# Patient Record
Sex: MALE | Race: OTHER | Employment: FULL TIME | URBAN - METROPOLITAN AREA
[De-identification: names, ages, dates, MRNs, and addresses within clinical notes are randomized per-mention and may not be internally consistent; named-entity substitution may affect disease eponyms.]

---

## 2019-03-24 ENCOUNTER — APPOINTMENT (OUTPATIENT)
Dept: CT IMAGING | Age: 59
DRG: 390 | End: 2019-03-24
Attending: EMERGENCY MEDICINE
Payer: COMMERCIAL

## 2019-03-24 ENCOUNTER — HOSPITAL ENCOUNTER (INPATIENT)
Age: 59
LOS: 2 days | Discharge: HOME | DRG: 390 | End: 2019-03-26
Attending: EMERGENCY MEDICINE | Admitting: SURGERY
Payer: COMMERCIAL

## 2019-03-24 DIAGNOSIS — K56.600 PARTIAL SMALL BOWEL OBSTRUCTION (HCC): ICD-10-CM

## 2019-03-24 DIAGNOSIS — R10.84 ABDOMINAL PAIN, GENERALIZED: Primary | ICD-10-CM

## 2019-03-24 PROBLEM — K56.609 SMALL BOWEL OBSTRUCTION (HCC): Status: ACTIVE | Noted: 2019-03-24

## 2019-03-24 LAB
ALBUMIN SERPL-MCNC: 2.9 G/DL (ref 3.5–5)
ALBUMIN/GLOB SERPL: 0.8 {RATIO} (ref 1.1–2.2)
ALP SERPL-CCNC: 41 U/L (ref 45–117)
ALT SERPL-CCNC: 23 U/L (ref 12–78)
ANION GAP SERPL CALC-SCNC: 8 MMOL/L (ref 5–15)
APPEARANCE UR: CLEAR
AST SERPL-CCNC: 13 U/L (ref 15–37)
BACTERIA URNS QL MICRO: ABNORMAL /HPF
BASOPHILS # BLD: 0.1 K/UL (ref 0–0.1)
BASOPHILS NFR BLD: 1 % (ref 0–1)
BILIRUB DIRECT SERPL-MCNC: 0.1 MG/DL (ref 0–0.2)
BILIRUB SERPL-MCNC: 0.5 MG/DL (ref 0.2–1)
BILIRUB UR QL: NEGATIVE
BUN SERPL-MCNC: 15 MG/DL (ref 6–20)
BUN/CREAT SERPL: 16 (ref 12–20)
CALCIUM SERPL-MCNC: 8 MG/DL (ref 8.5–10.1)
CHLORIDE SERPL-SCNC: 101 MMOL/L (ref 97–108)
CO2 SERPL-SCNC: 28 MMOL/L (ref 21–32)
COLOR UR: ABNORMAL
COMMENT, HOLDF: NORMAL
CREAT SERPL-MCNC: 0.95 MG/DL (ref 0.7–1.3)
DIFFERENTIAL METHOD BLD: ABNORMAL
EOSINOPHIL # BLD: 0.1 K/UL (ref 0–0.4)
EOSINOPHIL NFR BLD: 1 % (ref 0–7)
EPITH CASTS URNS QL MICRO: ABNORMAL /LPF
ERYTHROCYTE [DISTWIDTH] IN BLOOD BY AUTOMATED COUNT: 13.1 % (ref 11.5–14.5)
GLOBULIN SER CALC-MCNC: 3.6 G/DL (ref 2–4)
GLUCOSE SERPL-MCNC: 133 MG/DL (ref 65–100)
GLUCOSE UR STRIP.AUTO-MCNC: NEGATIVE MG/DL
HCT VFR BLD AUTO: 42.7 % (ref 36.6–50.3)
HGB BLD-MCNC: 14.2 G/DL (ref 12.1–17)
HGB UR QL STRIP: NEGATIVE
IMM GRANULOCYTES # BLD AUTO: 0.1 K/UL
IMM GRANULOCYTES NFR BLD AUTO: 1 %
KETONES UR QL STRIP.AUTO: 15 MG/DL
LACTATE SERPL-SCNC: 0.7 MMOL/L (ref 0.4–2)
LEUKOCYTE ESTERASE UR QL STRIP.AUTO: NEGATIVE
LIPASE SERPL-CCNC: 100 U/L (ref 73–393)
LYMPHOCYTES # BLD: 1.5 K/UL (ref 0.8–3.5)
LYMPHOCYTES NFR BLD: 20 % (ref 12–49)
MCH RBC QN AUTO: 26.9 PG (ref 26–34)
MCHC RBC AUTO-ENTMCNC: 33.3 G/DL (ref 30–36.5)
MCV RBC AUTO: 80.9 FL (ref 80–99)
MONOCYTES # BLD: 2 K/UL (ref 0–1)
MONOCYTES NFR BLD: 27 % (ref 5–13)
MUCOUS THREADS URNS QL MICRO: ABNORMAL /LPF
NEUTS BAND NFR BLD MANUAL: 5 % (ref 0–6)
NEUTS SEG # BLD: 3.5 K/UL (ref 1.8–8)
NEUTS SEG NFR BLD: 45 % (ref 32–75)
NITRITE UR QL STRIP.AUTO: NEGATIVE
NRBC # BLD: 0 K/UL (ref 0–0.01)
NRBC BLD-RTO: 0 PER 100 WBC
PH UR STRIP: 6 [PH] (ref 5–8)
PLATELET # BLD AUTO: 303 K/UL (ref 150–400)
PMV BLD AUTO: 9.4 FL (ref 8.9–12.9)
POTASSIUM SERPL-SCNC: 3.5 MMOL/L (ref 3.5–5.1)
PROT SERPL-MCNC: 6.5 G/DL (ref 6.4–8.2)
PROT UR STRIP-MCNC: ABNORMAL MG/DL
RBC # BLD AUTO: 5.28 M/UL (ref 4.1–5.7)
RBC #/AREA URNS HPF: ABNORMAL /HPF (ref 0–5)
RBC MORPH BLD: ABNORMAL
SAMPLES BEING HELD,HOLD: NORMAL
SODIUM SERPL-SCNC: 137 MMOL/L (ref 136–145)
SP GR UR REFRACTOMETRY: 1.01 (ref 1–1.03)
UR CULT HOLD, URHOLD: NORMAL
UROBILINOGEN UR QL STRIP.AUTO: 0.2 EU/DL (ref 0.2–1)
WBC # BLD AUTO: 7.3 K/UL (ref 4.1–11.1)
WBC URNS QL MICRO: ABNORMAL /HPF (ref 0–4)

## 2019-03-24 PROCEDURE — 81001 URINALYSIS AUTO W/SCOPE: CPT

## 2019-03-24 PROCEDURE — 65270000032 HC RM SEMIPRIVATE

## 2019-03-24 PROCEDURE — 80076 HEPATIC FUNCTION PANEL: CPT

## 2019-03-24 PROCEDURE — 99284 EMERGENCY DEPT VISIT MOD MDM: CPT

## 2019-03-24 PROCEDURE — 85025 COMPLETE CBC W/AUTO DIFF WBC: CPT

## 2019-03-24 PROCEDURE — 83605 ASSAY OF LACTIC ACID: CPT

## 2019-03-24 PROCEDURE — 96360 HYDRATION IV INFUSION INIT: CPT

## 2019-03-24 PROCEDURE — 74011636320 HC RX REV CODE- 636/320: Performed by: EMERGENCY MEDICINE

## 2019-03-24 PROCEDURE — 80048 BASIC METABOLIC PNL TOTAL CA: CPT

## 2019-03-24 PROCEDURE — 83690 ASSAY OF LIPASE: CPT

## 2019-03-24 PROCEDURE — 74011250636 HC RX REV CODE- 250/636: Performed by: SURGERY

## 2019-03-24 PROCEDURE — 36415 COLL VENOUS BLD VENIPUNCTURE: CPT

## 2019-03-24 PROCEDURE — 74177 CT ABD & PELVIS W/CONTRAST: CPT

## 2019-03-24 PROCEDURE — 74011250636 HC RX REV CODE- 250/636: Performed by: EMERGENCY MEDICINE

## 2019-03-24 PROCEDURE — 74011250637 HC RX REV CODE- 250/637: Performed by: SURGERY

## 2019-03-24 RX ORDER — SODIUM CHLORIDE, SODIUM LACTATE, POTASSIUM CHLORIDE, CALCIUM CHLORIDE 600; 310; 30; 20 MG/100ML; MG/100ML; MG/100ML; MG/100ML
75 INJECTION, SOLUTION INTRAVENOUS CONTINUOUS
Status: DISCONTINUED | OUTPATIENT
Start: 2019-03-24 | End: 2019-03-26 | Stop reason: HOSPADM

## 2019-03-24 RX ORDER — ALPRAZOLAM 1 MG/1
1 TABLET ORAL
COMMUNITY

## 2019-03-24 RX ORDER — ONDANSETRON 2 MG/ML
4 INJECTION INTRAMUSCULAR; INTRAVENOUS
Status: DISCONTINUED | OUTPATIENT
Start: 2019-03-24 | End: 2019-03-26 | Stop reason: HOSPADM

## 2019-03-24 RX ORDER — HYDROMORPHONE HYDROCHLORIDE 1 MG/ML
1 INJECTION, SOLUTION INTRAMUSCULAR; INTRAVENOUS; SUBCUTANEOUS
Status: DISCONTINUED | OUTPATIENT
Start: 2019-03-24 | End: 2019-03-26 | Stop reason: HOSPADM

## 2019-03-24 RX ORDER — SODIUM CHLORIDE 9 MG/ML
50 INJECTION, SOLUTION INTRAVENOUS
Status: COMPLETED | OUTPATIENT
Start: 2019-03-24 | End: 2019-03-24

## 2019-03-24 RX ORDER — CITALOPRAM 10 MG/1
10 TABLET ORAL DAILY
COMMUNITY

## 2019-03-24 RX ORDER — ACETAMINOPHEN 325 MG/1
650 TABLET ORAL
Status: DISCONTINUED | OUTPATIENT
Start: 2019-03-24 | End: 2019-03-26 | Stop reason: HOSPADM

## 2019-03-24 RX ORDER — SIMVASTATIN 40 MG/1
40 TABLET, FILM COATED ORAL
COMMUNITY

## 2019-03-24 RX ORDER — LISINOPRIL 20 MG/1
20 TABLET ORAL DAILY
COMMUNITY

## 2019-03-24 RX ORDER — SODIUM CHLORIDE 0.9 % (FLUSH) 0.9 %
10 SYRINGE (ML) INJECTION
Status: COMPLETED | OUTPATIENT
Start: 2019-03-24 | End: 2019-03-24

## 2019-03-24 RX ADMIN — SODIUM CHLORIDE 1000 ML: 900 INJECTION, SOLUTION INTRAVENOUS at 14:48

## 2019-03-24 RX ADMIN — SODIUM CHLORIDE 1000 ML: 900 INJECTION, SOLUTION INTRAVENOUS at 14:25

## 2019-03-24 RX ADMIN — IOPAMIDOL 100 ML: 755 INJECTION, SOLUTION INTRAVENOUS at 14:58

## 2019-03-24 RX ADMIN — ACETAMINOPHEN 650 MG: 325 TABLET ORAL at 16:41

## 2019-03-24 RX ADMIN — SODIUM CHLORIDE 50 ML/HR: 900 INJECTION, SOLUTION INTRAVENOUS at 14:58

## 2019-03-24 RX ADMIN — Medication 10 ML: at 14:58

## 2019-03-24 RX ADMIN — SODIUM CHLORIDE, SODIUM LACTATE, POTASSIUM CHLORIDE, AND CALCIUM CHLORIDE 125 ML/HR: 600; 310; 30; 20 INJECTION, SOLUTION INTRAVENOUS at 18:52

## 2019-03-24 NOTE — H&P
Chief Complaint:  Partial small bowel obstruction HPI:  64 yo man with hx HTN and no prior abdominal operation presented to [place] with abdominal pain for 4 days. Pt reported pain developed on Tuesday. Pt reported some diarrhea. Mild nausea and pt noted fever. He is afebrile in ER and no leukocytosis. CT scan showed mildly dilated loops of bowel with questionable mesenteric twisting concerning for possible internal hernia. Pt reported strenuous exercise over past week and thought it may have precipitated his pain. Past Medical History:  
Diagnosis Date  Hypercholesteremia  Hypertension  Psychiatric disorder Depression; Anxiety History reviewed. No pertinent surgical history. No current facility-administered medications on file prior to encounter. Current Outpatient Medications on File Prior to Encounter Medication Sig Dispense Refill  simvastatin (ZOCOR) 40 mg tablet Take 40 mg by mouth nightly.  lisinopril (PRINIVIL, ZESTRIL) 20 mg tablet Take 20 mg by mouth daily.  citalopram (CELEXA) 10 mg tablet Take 10 mg by mouth daily.  ALPRAZolam (XANAX) 1 mg tablet Take 1 mg by mouth. No Known Allergies Review of Systems - General ROS: negative Psychological ROS: negative Respiratory ROS: negative Cardiovascular ROS: negative Gastrointestinal ROS: positive for - abdominal pain and nausea Visit Vitals /79 (BP 1 Location: Right arm, BP Patient Position: At rest) Pulse 97 Temp 99.6 °F (37.6 °C) Resp 16 Wt 233 lb 14.5 oz (106.1 kg) SpO2 94% Physical Exam:   
Gen:  NAD Pulm:  Unlabored Abd:  S/ND/mild diffuse TTP without guarding or rebound Recent Results (from the past 24 hour(s)) CBC WITH AUTOMATED DIFF Collection Time: 03/24/19  2:16 PM  
Result Value Ref Range WBC 7.3 4.1 - 11.1 K/uL  
 RBC 5.28 4.10 - 5.70 M/uL  
 HGB 14.2 12.1 - 17.0 g/dL HCT 42.7 36.6 - 50.3 %  MCV 80.9 80.0 - 99.0 FL  
 MCH 26.9 26.0 - 34.0 PG  
 MCHC 33.3 30.0 - 36.5 g/dL  
 RDW 13.1 11.5 - 14.5 % PLATELET 336 346 - 678 K/uL MPV 9.4 8.9 - 12.9 FL  
 NRBC 0.0 0  WBC ABSOLUTE NRBC 0.00 0.00 - 0.01 K/uL NEUTROPHILS 45 32 - 75 % BAND NEUTROPHILS 5 0 - 6 % LYMPHOCYTES 20 12 - 49 % MONOCYTES 27 (H) 5 - 13 % EOSINOPHILS 1 0 - 7 % BASOPHILS 1 0 - 1 % IMMATURE GRANULOCYTES 1 %  
 ABS. NEUTROPHILS 3.5 1.8 - 8.0 K/UL  
 ABS. LYMPHOCYTES 1.5 0.8 - 3.5 K/UL  
 ABS. MONOCYTES 2.0 (H) 0.0 - 1.0 K/UL  
 ABS. EOSINOPHILS 0.1 0.0 - 0.4 K/UL  
 ABS. BASOPHILS 0.1 0.0 - 0.1 K/UL  
 ABS. IMM. GRANS. 0.1 K/UL  
 DF MANUAL    
 RBC COMMENTS NORMOCYTIC, NORMOCHROMIC    
HEPATIC FUNCTION PANEL Collection Time: 03/24/19  2:16 PM  
Result Value Ref Range Protein, total 6.5 6.4 - 8.2 g/dL Albumin 2.9 (L) 3.5 - 5.0 g/dL Globulin 3.6 2.0 - 4.0 g/dL A-G Ratio 0.8 (L) 1.1 - 2.2 Bilirubin, total 0.5 0.2 - 1.0 MG/DL Bilirubin, direct 0.1 0.0 - 0.2 MG/DL Alk. phosphatase 41 (L) 45 - 117 U/L  
 AST (SGOT) 13 (L) 15 - 37 U/L  
 ALT (SGPT) 23 12 - 78 U/L  
LIPASE Collection Time: 03/24/19  2:16 PM  
Result Value Ref Range Lipase 100 73 - 584 U/L  
METABOLIC PANEL, BASIC Collection Time: 03/24/19  2:16 PM  
Result Value Ref Range Sodium 137 136 - 145 mmol/L Potassium 3.5 3.5 - 5.1 mmol/L Chloride 101 97 - 108 mmol/L  
 CO2 28 21 - 32 mmol/L Anion gap 8 5 - 15 mmol/L Glucose 133 (H) 65 - 100 mg/dL BUN 15 6 - 20 MG/DL Creatinine 0.95 0.70 - 1.30 MG/DL  
 BUN/Creatinine ratio 16 12 - 20 GFR est AA >60 >60 ml/min/1.73m2 GFR est non-AA >60 >60 ml/min/1.73m2 Calcium 8.0 (L) 8.5 - 10.1 MG/DL  
SAMPLES BEING HELD Collection Time: 03/24/19  2:16 PM  
Result Value Ref Range SAMPLES BEING HELD 1BLUE, 1RED COMMENT Add-on orders for these samples will be processed based on acceptable specimen integrity and analyte stability, which may vary by analyte. URINALYSIS W/MICROSCOPIC Collection Time: 03/24/19  3:20 PM  
Result Value Ref Range Color YELLOW/STRAW Appearance CLEAR CLEAR Specific gravity 1.010 1.003 - 1.030    
 pH (UA) 6.0 5.0 - 8.0 Protein TRACE (A) NEG mg/dL Glucose NEGATIVE  NEG mg/dL Ketone 15 (A) NEG mg/dL Bilirubin NEGATIVE  NEG Blood NEGATIVE  NEG Urobilinogen 0.2 0.2 - 1.0 EU/dL Nitrites NEGATIVE  NEG Leukocyte Esterase NEGATIVE  NEG    
 WBC 0-4 0 - 4 /hpf  
 RBC 0-5 0 - 5 /hpf Epithelial cells FEW FEW /lpf Bacteria 1+ (A) NEG /hpf Mucus 1+ (A) NEG /lpf URINE CULTURE HOLD SAMPLE Collection Time: 03/24/19  3:20 PM  
Result Value Ref Range Urine culture hold URINE ON HOLD IN MICROBIOLOGY DEPT FOR 3 DAYS. IF UNPRESERVED URINE IS SUBMITTED, IT CANNOT BE USED FOR ADDITIONAL TESTING AFTER 24 HRS, RECOLLECTION WILL BE REQUIRED. LACTIC ACID Collection Time: 03/24/19  4:20 PM  
Result Value Ref Range Lactic acid 0.7 0.4 - 2.0 MMOL/L  
 
 
AP: 64 yo man with abdominal pain - Abdominal pain:  Unclear etiology. Will admit for IV fluid hydration and close monitoring given concern for internal hernia. Patient does not have peritonitis. - NPO with sips of clears - Labs in am  
- Repeat AXR in am

## 2019-03-24 NOTE — CONSULTS
Chief Complaint:  Partial small bowel obstruction    HPI:  64 yo man with hx HTN and no prior abdominal operation presented to [facility] with abdominal pain for 4 days. Pt reported pain developed on Tuesday. Pt reported some diarrhea. Mild nausea and pt noted fever. He is afebrile in ER and no leukocytosis. CT scan showed mildly dilated loops of bowel with questionable mesenteric twisting concerning for possible internal hernia. Pt reported strenuous exercise over past week and thought it may have precipitated his pain. Past Medical History:   Diagnosis Date    Hypercholesteremia     Hypertension     Psychiatric disorder     Depression; Anxiety       History reviewed. No pertinent surgical history. No current facility-administered medications on file prior to encounter. Current Outpatient Medications on File Prior to Encounter   Medication Sig Dispense Refill    simvastatin (ZOCOR) 40 mg tablet Take 40 mg by mouth nightly.  lisinopril (PRINIVIL, ZESTRIL) 20 mg tablet Take 20 mg by mouth daily.  citalopram (CELEXA) 10 mg tablet Take 10 mg by mouth daily.  ALPRAZolam (XANAX) 1 mg tablet Take 1 mg by mouth.          No Known Allergies    Review of Systems - General ROS: negative  Psychological ROS: negative  Respiratory ROS: negative  Cardiovascular ROS: negative  Gastrointestinal ROS: positive for - abdominal pain and nausea    Visit Vitals  /79 (BP 1 Location: Right arm, BP Patient Position: At rest)   Pulse 97   Temp 99.6 °F (37.6 °C)   Resp 16   Wt 233 lb 14.5 oz (106.1 kg)   SpO2 94%         Physical Exam:    Gen:  NAD  Pulm:  Unlabored  Abd:  S/ND/mild diffuse TTP without guarding or rebound    Recent Results (from the past 24 hour(s))   CBC WITH AUTOMATED DIFF    Collection Time: 03/24/19  2:16 PM   Result Value Ref Range    WBC 7.3 4.1 - 11.1 K/uL    RBC 5.28 4.10 - 5.70 M/uL    HGB 14.2 12.1 - 17.0 g/dL    HCT 42.7 36.6 - 50.3 %    MCV 80.9 80.0 - 99.0 FL    MCH 26.9 26.0 - 34.0 PG    MCHC 33.3 30.0 - 36.5 g/dL    RDW 13.1 11.5 - 14.5 %    PLATELET 952 414 - 240 K/uL    MPV 9.4 8.9 - 12.9 FL    NRBC 0.0 0  WBC    ABSOLUTE NRBC 0.00 0.00 - 0.01 K/uL    NEUTROPHILS 45 32 - 75 %    BAND NEUTROPHILS 5 0 - 6 %    LYMPHOCYTES 20 12 - 49 %    MONOCYTES 27 (H) 5 - 13 %    EOSINOPHILS 1 0 - 7 %    BASOPHILS 1 0 - 1 %    IMMATURE GRANULOCYTES 1 %    ABS. NEUTROPHILS 3.5 1.8 - 8.0 K/UL    ABS. LYMPHOCYTES 1.5 0.8 - 3.5 K/UL    ABS. MONOCYTES 2.0 (H) 0.0 - 1.0 K/UL    ABS. EOSINOPHILS 0.1 0.0 - 0.4 K/UL    ABS. BASOPHILS 0.1 0.0 - 0.1 K/UL    ABS. IMM. GRANS. 0.1 K/UL    DF MANUAL      RBC COMMENTS NORMOCYTIC, NORMOCHROMIC     HEPATIC FUNCTION PANEL    Collection Time: 03/24/19  2:16 PM   Result Value Ref Range    Protein, total 6.5 6.4 - 8.2 g/dL    Albumin 2.9 (L) 3.5 - 5.0 g/dL    Globulin 3.6 2.0 - 4.0 g/dL    A-G Ratio 0.8 (L) 1.1 - 2.2      Bilirubin, total 0.5 0.2 - 1.0 MG/DL    Bilirubin, direct 0.1 0.0 - 0.2 MG/DL    Alk. phosphatase 41 (L) 45 - 117 U/L    AST (SGOT) 13 (L) 15 - 37 U/L    ALT (SGPT) 23 12 - 78 U/L   LIPASE    Collection Time: 03/24/19  2:16 PM   Result Value Ref Range    Lipase 100 73 - 118 U/L   METABOLIC PANEL, BASIC    Collection Time: 03/24/19  2:16 PM   Result Value Ref Range    Sodium 137 136 - 145 mmol/L    Potassium 3.5 3.5 - 5.1 mmol/L    Chloride 101 97 - 108 mmol/L    CO2 28 21 - 32 mmol/L    Anion gap 8 5 - 15 mmol/L    Glucose 133 (H) 65 - 100 mg/dL    BUN 15 6 - 20 MG/DL    Creatinine 0.95 0.70 - 1.30 MG/DL    BUN/Creatinine ratio 16 12 - 20      GFR est AA >60 >60 ml/min/1.73m2    GFR est non-AA >60 >60 ml/min/1.73m2    Calcium 8.0 (L) 8.5 - 10.1 MG/DL   SAMPLES BEING HELD    Collection Time: 03/24/19  2:16 PM   Result Value Ref Range    SAMPLES BEING HELD 1BLUE, 1RED     COMMENT        Add-on orders for these samples will be processed based on acceptable specimen integrity and analyte stability, which may vary by analyte.    URINALYSIS W/MICROSCOPIC    Collection Time: 03/24/19  3:20 PM   Result Value Ref Range    Color YELLOW/STRAW      Appearance CLEAR CLEAR      Specific gravity 1.010 1.003 - 1.030      pH (UA) 6.0 5.0 - 8.0      Protein TRACE (A) NEG mg/dL    Glucose NEGATIVE  NEG mg/dL    Ketone 15 (A) NEG mg/dL    Bilirubin NEGATIVE  NEG      Blood NEGATIVE  NEG      Urobilinogen 0.2 0.2 - 1.0 EU/dL    Nitrites NEGATIVE  NEG      Leukocyte Esterase NEGATIVE  NEG      WBC 0-4 0 - 4 /hpf    RBC 0-5 0 - 5 /hpf    Epithelial cells FEW FEW /lpf    Bacteria 1+ (A) NEG /hpf    Mucus 1+ (A) NEG /lpf   URINE CULTURE HOLD SAMPLE    Collection Time: 03/24/19  3:20 PM   Result Value Ref Range    Urine culture hold        URINE ON HOLD IN MICROBIOLOGY DEPT FOR 3 DAYS. IF UNPRESERVED URINE IS SUBMITTED, IT CANNOT BE USED FOR ADDITIONAL TESTING AFTER 24 HRS, RECOLLECTION WILL BE REQUIRED. LACTIC ACID    Collection Time: 03/24/19  4:20 PM   Result Value Ref Range    Lactic acid 0.7 0.4 - 2.0 MMOL/L       AP: 64 yo man with abdominal pain    - Abdominal pain:  Unclear etiology. Will admit for IV fluid hydration and close monitoring given concern for internal hernia. Patient does not have peritonitis.   - NPO with sips of clears  - Labs in am   - Repeat AXR in am

## 2019-03-24 NOTE — ED PROVIDER NOTES
58-year-old white male presents to the emergency department with abdominal pain and fever. Patient reports that about 4 days ago he started having mid abdominal cramping and bloating. He reports he's had several episodes of diarrhea per day for the past several days. He had a fever to 102°F today. Abdominal pain currently is mild. Pain is central. He feels more of a bloating and a pressure. He says that moving and walking stairs causes his abdominal pain to be worse. Patient denies vomiting. No dysuria. He has been urinating a little less frequently because he thinks he is dehydrated. No cough. No shortness of breath or chest pain. No headaches. Patient otherwise has no other medical problems. No previous abdominal surgeries. Past Medical History:  
Diagnosis Date  Hypertension No past surgical history on file. No family history on file. Social History Socioeconomic History  Marital status: Not on file Spouse name: Not on file  Number of children: Not on file  Years of education: Not on file  Highest education level: Not on file Occupational History  Not on file Social Needs  Financial resource strain: Not on file  Food insecurity:  
  Worry: Not on file Inability: Not on file  Transportation needs:  
  Medical: Not on file Non-medical: Not on file Tobacco Use  Smoking status: Not on file Substance and Sexual Activity  Alcohol use: Not Currently  Drug use: Not on file  Sexual activity: Not on file Lifestyle  Physical activity:  
  Days per week: Not on file Minutes per session: Not on file  Stress: Not on file Relationships  Social connections:  
  Talks on phone: Not on file Gets together: Not on file Attends [employer] service: Not on file Active member of club or organization: Not on file Attends meetings of clubs or organizations: Not on file Relationship status: Not on file  Intimate partner violence:  
  Fear of current or ex partner: Not on file Emotionally abused: Not on file Physically abused: Not on file Forced sexual activity: Not on file Other Topics Concern  Not on file Social History Narrative  Not on file ALLERGIES: Patient has no allergy information on record. Review of Systems Constitutional: Negative for fever. HENT: Negative for congestion. Eyes: Negative for pain. Respiratory: Negative for shortness of breath. Cardiovascular: Negative for chest pain and leg swelling. Gastrointestinal: Positive for abdominal pain and diarrhea. Negative for nausea and vomiting. Endocrine: Negative for polyuria. Genitourinary: Positive for decreased urine volume. Negative for flank pain. Musculoskeletal: Negative for gait problem, neck pain and neck stiffness. Skin: Negative for color change. Allergic/Immunologic: Negative for immunocompromised state. Neurological: Negative for speech difficulty and headaches. Hematological: Does not bruise/bleed easily. Psychiatric/Behavioral: Negative for confusion. All other systems reviewed and are negative. There were no vitals filed for this visit. Physical Exam  
Constitutional: He is oriented to person, place, and time. He appears well-developed and well-nourished. No distress. HENT:  
Head: Normocephalic and atraumatic. Right Ear: External ear normal.  
Left Ear: External ear normal.  
Eyes: Pupils are equal, round, and reactive to light. EOM are normal.  
Neck: Normal range of motion. Neck supple. No JVD present. No tracheal deviation present. Cardiovascular: Normal rate, regular rhythm and normal heart sounds. Exam reveals no gallop and no friction rub. No murmur heard. Pulmonary/Chest: Effort normal and breath sounds normal. No stridor. No respiratory distress. He has no wheezes. He has no rales. Abdominal: Soft. Bowel sounds are normal. He exhibits no distension. There is tenderness. There is no rebound and no guarding. Mild mid abd pain with palpation, no upper abd pain, no lower abd pain, pain is mild periumbilical and with deep palpation only Musculoskeletal: Normal range of motion. He exhibits no edema or tenderness. Neurological: He is alert and oriented to person, place, and time. He has normal reflexes. No cranial nerve deficit or sensory deficit. He exhibits normal muscle tone. Coordination normal.  
Skin: Skin is warm and dry. No rash noted. He is not diaphoretic. No erythema. Psychiatric: He has a normal mood and affect. His behavior is normal. Judgment and thought content normal.  
Nursing note and vitals reviewed. MDM Number of Diagnoses or Management Options Diagnosis management comments: Patient has mild mid abdominal pain. Patient has fever. We'll check urinalysis, blood work, CT scan of the abdomen. Differential diagnosis includes viral gastroenteritis, pancreatitis, colitis, diverticulitis, appendicitis. We'll reassess after testing. Patient agrees w plan. Amount and/or Complexity of Data Reviewed Clinical lab tests: reviewed and ordered Tests in the radiology section of CPT®: ordered and reviewed Tests in the medicine section of CPT®: ordered and reviewed Decide to obtain previous medical records or to obtain history from someone other than the patient: yes Obtain history from someone other than the patient: yes Review and summarize past medical records: yes Independent visualization of images, tracings, or specimens: yes Procedures Blood work is WNL 
 
CT abd: Mid abd shows possible internal hernia and possible small bowel obstruction UA: Clear CONSULT 
4:06 PM 
I spoke to Dr Augustine Almanzar who will admit pt for serial exams and to make sure his symptoms improve. PT is fine w/ this plan

## 2019-03-24 NOTE — ED TRIAGE NOTES
TRIAGE NOTES: Pt c/o stomach cramps, low grade fever since last Tuesday. Diarrhea  but \"stopped Thursday\". Nausea. Pt denies SOB, HA, Vomiting. Hasn't been eating. Ibuprofen taken last night for fever.

## 2019-03-24 NOTE — ED NOTES
TRANSFER - OUT REPORT: 
 
Verbal report given to Batsheva Sender (name) on Tamika Webber  being transferred to [facility] Rm 515(unit) for routine progression of care Report consisted of patients Situation, Background, Assessment and  
Recommendations(SBAR). Information from the following report(s) SBAR, Kardex, ED Summary, [facility] and Recent Results was reviewed with the receiving nurse. Lines:  
Peripheral IV 03/24/19 Left Forearm (Active) Opportunity for questions and clarification was provided. Patient transported with: 
 Holden august

## 2019-03-25 ENCOUNTER — APPOINTMENT (OUTPATIENT)
Dept: GENERAL RADIOLOGY | Age: 59
DRG: 390 | End: 2019-03-25
Attending: SURGERY
Payer: COMMERCIAL

## 2019-03-25 LAB
ANION GAP SERPL CALC-SCNC: 7 MMOL/L (ref 5–15)
BUN SERPL-MCNC: 11 MG/DL (ref 6–20)
BUN/CREAT SERPL: 15 (ref 12–20)
CALCIUM SERPL-MCNC: 7.9 MG/DL (ref 8.5–10.1)
CHLORIDE SERPL-SCNC: 105 MMOL/L (ref 97–108)
CO2 SERPL-SCNC: 25 MMOL/L (ref 21–32)
CREAT SERPL-MCNC: 0.74 MG/DL (ref 0.7–1.3)
ERYTHROCYTE [DISTWIDTH] IN BLOOD BY AUTOMATED COUNT: 12.8 % (ref 11.5–14.5)
GLUCOSE BLD STRIP.AUTO-MCNC: 92 MG/DL (ref 65–100)
GLUCOSE SERPL-MCNC: 88 MG/DL (ref 65–100)
HCT VFR BLD AUTO: 39.5 % (ref 36.6–50.3)
HGB BLD-MCNC: 13 G/DL (ref 12.1–17)
MCH RBC QN AUTO: 27.1 PG (ref 26–34)
MCHC RBC AUTO-ENTMCNC: 32.9 G/DL (ref 30–36.5)
MCV RBC AUTO: 82.3 FL (ref 80–99)
NRBC # BLD: 0 K/UL (ref 0–0.01)
NRBC BLD-RTO: 0 PER 100 WBC
PLATELET # BLD AUTO: 296 K/UL (ref 150–400)
PMV BLD AUTO: 9.9 FL (ref 8.9–12.9)
POTASSIUM SERPL-SCNC: 3.4 MMOL/L (ref 3.5–5.1)
RBC # BLD AUTO: 4.8 M/UL (ref 4.1–5.7)
SERVICE CMNT-IMP: NORMAL
SODIUM SERPL-SCNC: 137 MMOL/L (ref 136–145)
WBC # BLD AUTO: 7.2 K/UL (ref 4.1–11.1)

## 2019-03-25 PROCEDURE — 80048 BASIC METABOLIC PNL TOTAL CA: CPT

## 2019-03-25 PROCEDURE — 74011250636 HC RX REV CODE- 250/636: Performed by: SURGERY

## 2019-03-25 PROCEDURE — 85027 COMPLETE CBC AUTOMATED: CPT

## 2019-03-25 PROCEDURE — 82962 GLUCOSE BLOOD TEST: CPT

## 2019-03-25 PROCEDURE — 74019 RADEX ABDOMEN 2 VIEWS: CPT

## 2019-03-25 PROCEDURE — 36415 COLL VENOUS BLD VENIPUNCTURE: CPT

## 2019-03-25 PROCEDURE — 65270000032 HC RM SEMIPRIVATE

## 2019-03-25 PROCEDURE — 74011250637 HC RX REV CODE- 250/637: Performed by: SURGERY

## 2019-03-25 PROCEDURE — 71045 X-RAY EXAM CHEST 1 VIEW: CPT

## 2019-03-25 RX ORDER — OXYCODONE HYDROCHLORIDE 5 MG/1
5 TABLET ORAL
Status: DISCONTINUED | OUTPATIENT
Start: 2019-03-25 | End: 2019-03-26 | Stop reason: HOSPADM

## 2019-03-25 RX ADMIN — ACETAMINOPHEN 650 MG: 325 TABLET ORAL at 03:09

## 2019-03-25 RX ADMIN — SODIUM CHLORIDE, SODIUM LACTATE, POTASSIUM CHLORIDE, AND CALCIUM CHLORIDE 75 ML/HR: 600; 310; 30; 20 INJECTION, SOLUTION INTRAVENOUS at 21:28

## 2019-03-25 RX ADMIN — SODIUM CHLORIDE, SODIUM LACTATE, POTASSIUM CHLORIDE, AND CALCIUM CHLORIDE 125 ML/HR: 600; 310; 30; 20 INJECTION, SOLUTION INTRAVENOUS at 03:07

## 2019-03-25 NOTE — PROGRESS NOTES
Mr. Danielle Soto feels better this PM. No nausea or vomitting. Moving bowels. Tm 102 Tc 98.1 HR: 89 BP: 139/79 Resp Rate: 14 95% sat on 0.5 liters/minute. Intake/Output Summary (Last 24 hours) at 3/25/2019 1257 Last data filed at 3/25/2019 4296 Gross per 24 hour Intake  Output 1030 ml Net -1030 ml Exam: Cor: RRR. Lungs: Bilateral breath sounds. Clear to auscultation. Abd: Soft. Non distended. Non tender. No guarding or rebound. Small, reducible umbilical hernia. Labs:  
Recent Results (from the past 12 hour(s)) CBC W/O DIFF Collection Time: 03/25/19  3:21 AM  
Result Value Ref Range WBC 7.2 4.1 - 11.1 K/uL  
 RBC 4.80 4. 10 - 5.70 M/uL  
 HGB 13.0 12.1 - 17.0 g/dL HCT 39.5 36.6 - 50.3 % MCV 82.3 80.0 - 99.0 FL  
 MCH 27.1 26.0 - 34.0 PG  
 MCHC 32.9 30.0 - 36.5 g/dL  
 RDW 12.8 11.5 - 14.5 % PLATELET 096 035 - 225 K/uL MPV 9.9 8.9 - 12.9 FL  
 NRBC 0.0 0  WBC ABSOLUTE NRBC 0.00 0.00 - 0.01 K/uL METABOLIC PANEL, BASIC Collection Time: 03/25/19  3:21 AM  
Result Value Ref Range Sodium 137 136 - 145 mmol/L Potassium 3.4 (L) 3.5 - 5.1 mmol/L Chloride 105 97 - 108 mmol/L  
 CO2 25 21 - 32 mmol/L Anion gap 7 5 - 15 mmol/L Glucose 88 65 - 100 mg/dL BUN 11 6 - 20 MG/DL Creatinine 0.74 0.70 - 1.30 MG/DL  
 BUN/Creatinine ratio 15 12 - 20 GFR est AA >60 >60 ml/min/1.73m2 GFR est non-AA >60 >60 ml/min/1.73m2 Calcium 7.9 (L) 8.5 - 10.1 MG/DL  
GLUCOSE, POC Collection Time: 03/25/19  3:26 AM  
Result Value Ref Range Glucose (POC) 92 65 - 100 mg/dL Performed by Bee Cevallos Will try clear liquid diet and advance as tolerated. Decrease IVF to 75 ml/hour. OOB, Ambulate. Anti-emetics and pain medication as needed.

## 2019-03-25 NOTE — PROGRESS NOTES
Bedside shift change report given to Kristine Viveros (oncoming nurse) by Shira Nuñez (offgoing nurse). Report included the following information SBAR, Kardex, MAR and Quality Measures.

## 2019-03-25 NOTE — PROGRESS NOTES
Bedside shift change report given to Shantanu Alejandro RN (oncoming nurse) by Yasmeen Witt RN (offgoing nurse). Report included the following information SBAR, ED summary, kardex.

## 2019-03-25 NOTE — PROGRESS NOTES
General Surgery Daily Progress Note Admit Date: 3/24/2019 Post-Operative Day: * No surgery found * from * No surgery found * Subjective:  
 
Last 24 hrs: Pt doesn't c/o any abd pain at present; no n/v. Feels somewhat bloated; passing a little gas, no bM  
KUB done and pending. Temp 100.6 at 2am, nl WBC Objective:  
 
Blood pressure 139/79, pulse 89, temperature 98.1 °F (36.7 °C), resp. rate 14, weight 233 lb 14.5 oz (106.1 kg), SpO2 95 %. Temp (24hrs), Av.7 °F (37.6 °C), Min:98.1 °F (36.7 °C), Max:102 °F (38.9 °C) 
 
 
_____________________ Physical Exam:    
Alert and Oriented, x3, in no acute distress. Cardiovascular: RRR, no peripheral edema Abdomen: soft w/ some distention, diminished BS, NT Assessment:  
Active Problems: 
  Small bowel obstruction ([address] (3/24/2019) Plan:  
 
Cont NPO Ck KUB Cont IVF Monitor temps Data Review: 
 
Recent Labs  
  19 
0321 19 
1416 WBC 7.2 7.3 HGB 13.0 14.2 HCT 39.5 42.7  303 Recent Labs  
  19 
0321 19 
1416  137  
K 3.4* 3.5  101 CO2 25 28 GLU 88 133* BUN 11 15 CREA 0.74 0.95  
CA 7.9* 8.0* ALB  --  2.9*  
SGOT  --  13* ALT  --  23 Recent Labs  
  19 
1416 LPSE 100  
 
 
 
 
______________________ Medications: 
 
Current Facility-Administered Medications Medication Dose Route Frequency  lactated Ringers infusion  125 mL/hr IntraVENous CONTINUOUS  
 ondansetron (ZOFRAN) injection 4 mg  4 mg IntraVENous Q4H PRN  
 HYDROmorphone (PF) (DILAUDID) injection 1 mg  1 mg IntraVENous Q3H PRN  
 acetaminophen (TYLENOL) tablet 650 mg  650 mg Oral Q6H PRN Marvine Fridge, NP 
3/25/2019

## 2019-03-26 VITALS
HEART RATE: 83 BPM | OXYGEN SATURATION: 95 % | SYSTOLIC BLOOD PRESSURE: 146 MMHG | RESPIRATION RATE: 16 BRPM | DIASTOLIC BLOOD PRESSURE: 86 MMHG | WEIGHT: 233.91 LBS | TEMPERATURE: 98.3 F

## 2019-03-26 PROCEDURE — 74011250637 HC RX REV CODE- 250/637: Performed by: SURGERY

## 2019-03-26 PROCEDURE — 74011250636 HC RX REV CODE- 250/636: Performed by: SURGERY

## 2019-03-26 PROCEDURE — 74011250637 HC RX REV CODE- 250/637: Performed by: NURSE PRACTITIONER

## 2019-03-26 RX ORDER — CITALOPRAM 20 MG/1
10 TABLET, FILM COATED ORAL DAILY
Status: DISCONTINUED | OUTPATIENT
Start: 2019-03-26 | End: 2019-03-26 | Stop reason: HOSPADM

## 2019-03-26 RX ORDER — METRONIDAZOLE 500 MG/1
500 TABLET ORAL 3 TIMES DAILY
Qty: 15 TAB | Refills: 0 | Status: SHIPPED | OUTPATIENT
Start: 2019-03-26 | End: 2019-03-31

## 2019-03-26 RX ORDER — ALPRAZOLAM 1 MG/1
1 TABLET ORAL ONCE
Status: COMPLETED | OUTPATIENT
Start: 2019-03-26 | End: 2019-03-26

## 2019-03-26 RX ORDER — OXYCODONE HYDROCHLORIDE 5 MG/1
5 TABLET ORAL
Qty: 20 TAB | Refills: 0 | Status: SHIPPED | OUTPATIENT
Start: 2019-03-26 | End: 2019-03-29

## 2019-03-26 RX ORDER — LEVOFLOXACIN 500 MG/1
500 TABLET, FILM COATED ORAL DAILY
Qty: 5 TAB | Refills: 0 | Status: SHIPPED | OUTPATIENT
Start: 2019-03-26 | End: 2019-03-31

## 2019-03-26 RX ORDER — SIMVASTATIN 40 MG/1
40 TABLET, FILM COATED ORAL DAILY
Status: DISCONTINUED | OUTPATIENT
Start: 2019-03-26 | End: 2019-03-26 | Stop reason: HOSPADM

## 2019-03-26 RX ORDER — LISINOPRIL 20 MG/1
20 TABLET ORAL DAILY
Status: DISCONTINUED | OUTPATIENT
Start: 2019-03-26 | End: 2019-03-26 | Stop reason: HOSPADM

## 2019-03-26 RX ADMIN — SIMVASTATIN 40 MG: 40 TABLET, FILM COATED ORAL at 10:14

## 2019-03-26 RX ADMIN — LISINOPRIL 20 MG: 20 TABLET ORAL at 11:55

## 2019-03-26 RX ADMIN — CITALOPRAM HYDROBROMIDE 10 MG: 20 TABLET ORAL at 11:55

## 2019-03-26 RX ADMIN — SODIUM CHLORIDE, SODIUM LACTATE, POTASSIUM CHLORIDE, AND CALCIUM CHLORIDE 75 ML/HR: 600; 310; 30; 20 INJECTION, SOLUTION INTRAVENOUS at 10:15

## 2019-03-26 RX ADMIN — ALPRAZOLAM 1 MG: 1 TABLET ORAL at 00:23

## 2019-03-26 NOTE — DISCHARGE INSTRUCTIONS
DISCHARGE SUMMARY from Nurse    PATIENT INSTRUCTIONS:    After general anesthesia or intravenous sedation, for 24 hours or while taking prescription Narcotics:  · Limit your activities  · Do not drive and operate hazardous machinery  · Do not make important personal or business decisions  · Do  not drink alcoholic beverages  · If you have not urinated within 8 hours after discharge, please contact your surgeon on call. Report the following to your surgeon:  · Excessive pain, swelling, redness or odor of or around the surgical area  · Temperature over 100.5  · Nausea and vomiting lasting longer than 4 hours or if unable to take medications  · Any signs of decreased circulation or nerve impairment to extremity: change in color, persistent  numbness, tingling, coldness or increase pain  · Any questions    What to do at Home:  Recommended activity: {discharge activity:82396}, ***    If you experience any of the following symptoms ***, please follow up with ***. *  Please give a list of your current medications to your Primary Care Provider. *  Please update this list whenever your medications are discontinued, doses are      changed, or new medications (including over-the-counter products) are added. *  Please carry medication information at all times in case of emergency situations. These are general instructions for a healthy lifestyle:    No smoking/ No tobacco products/ Avoid exposure to second hand smoke  Surgeon General's Warning:  Quitting smoking now greatly reduces serious risk to your health.     Obesity, smoking, and sedentary lifestyle greatly increases your risk for illness    A healthy diet, regular physical exercise & weight monitoring are important for maintaining a healthy lifestyle    You may be retaining fluid if you have a history of heart failure or if you experience any of the following symptoms:  Weight gain of 3 pounds or more overnight or 5 pounds in a week, increased swelling in our hands or feet or shortness of breath while lying flat in bed. Please call your doctor as soon as you notice any of these symptoms; do not wait until your next office visit. Recognize signs and symptoms of STROKE:    F-face looks uneven    A-arms unable to move or move unevenly    S-speech slurred or non-existent    T-time-call 911 as soon as signs and symptoms begin-DO NOT go       Back to bed or wait to see if you get better-TIME IS BRAIN. Warning Signs of HEART ATTACK     Call 911 if you have these symptoms:   Chest discomfort. Most heart attacks involve discomfort in the center of the chest that lasts more than a few minutes, or that goes away and comes back. It can feel like uncomfortable pressure, squeezing, fullness, or pain.  Discomfort in other areas of the upper body. Symptoms can include pain or discomfort in one or both arms, the back, neck, jaw, or stomach.  Shortness of breath with or without chest discomfort.  Other signs may include breaking out in a cold sweat, nausea, or lightheadedness. Don't wait more than five minutes to call 911 - MINUTES MATTER! Fast action can save your life. Calling 911 is almost always the fastest way to get lifesaving treatment. Emergency Medical Services staff can begin treatment when they arrive -- up to an hour sooner than if someone gets to the hospital by car. The discharge information has been reviewed with the {PATIENT PARENT GUARDIAN:33644}. The {PATIENT PARENT GUARDIAN:80425} verbalized understanding. Discharge medications reviewed with the {Dishcarge meds reviewed CQXX:86159} and appropriate educational materials and side effects teaching were provided. ___________________________________________________________________________________________________________________________________1. Do not drive while on pain medication. You should take a stool softener while on pain medication to prevent constipation.   2.  You may resume your home medications. 3.  You will need to take Levofloxacin and Metronidazole antibiotics for 5 days. You should eat a cup of yogurt daily or take a probiotic while on antibiotic to prevent antibiotic associated diarrhea. 5.  You may call [phone] to schedule a follow-up with Dr. Jonh Obregon as needed. Patient Education        Bowel Blockage (Intestinal Obstruction): Care Instructions  Your Care Instructions  A bowel blockage, also called an intestinal obstruction, can prevent gas, fluids, or solids from moving through the intestines normally. It can cause constipation and, rarely, diarrhea. You may have pain, nausea, vomiting, and cramping. Most of the time, complete blockages require a stay in the hospital and possibly surgery. But if your bowel is only partly blocked, your doctor may tell you to wait until it clears on its own and you are able to pass gas and stool. If so, there are things you can do at home to help make you feel better. If you have had surgery for a bowel blockage, there are things you can do at home to make sure you heal well. You can also make some changes to keep your bowel from becoming blocked again. Follow-up care is a key part of your treatment and safety. Be sure to make and go to all appointments, and call your doctor if you are having problems. It's also a good idea to know your test results and keep a list of the medicines you take. How can you care for yourself at home? If your doctor has told you to wait at home for a blockage to clear on its own:  · Follow your doctor's instructions. These may include eating a liquid diet to avoid complete blockage. · Be safe with medicines. Take your medicines exactly as prescribed. Call your doctor if you think you are having a problem with your medicine. · Put a heating pad set on low on your belly to relieve mild cramps and pain. To prevent another blockage  · Try to eat smaller amounts of food more often.  For example, have 5 or 6 small meals throughout the day instead of 2 or 3 large meals. · Chew your food very well. Try to chew each bite about 20 times or until it is liquid. · Avoid high-fiber foods and raw fruits and vegetables with skins, husks, strings, or seeds. These can form a ball of undigested material that can cause a blockage if a part of your bowel is scarred or narrowed. · Check with your doctor before you eat whole-grain products or use a fiber supplement such as Citrucel or Metamucil. · To help you have regular bowel movements, eat at regular times, do not strain during a bowel movement, and drink at least 8 to 10 glasses of water each day. If you have kidney, heart, or liver disease and have to limit fluids, talk with your doctor or before you increase the amount of fluids you drink. · Drink high-calorie liquid formulas if your doctor says to. Severe symptoms may make it hard for your body to take in vitamins and minerals. · Get regular exercise. It helps you digest your food better. Get at least 30 minutes of physical activity on most days of the week. Walking is a good choice. When should you call for help? Call your doctor now or seek immediate medical care if:    · You have a fever.     · You are vomiting.     · You have new or worse belly pain.     · You cannot pass stools or gas.    Watch closely for changes in your health, and be sure to contact your doctor if you have any problems. Where can you learn more? Go to http://jesus-booker.info/. Enter T519 in the search box to learn more about \"Bowel Blockage (Intestinal Obstruction): Care Instructions. \"  Current as of: March 27, 2018  Content Version: 11.9  © 0401-4504 [employer]. Care instructions adapted under license by [employer] (which disclaims liability or warranty for this information).  If you have questions about a medical condition or this instruction, always ask your healthcare professional. Evelin Martinez disclaims any warranty or liability for your use of this information.

## 2019-03-26 NOTE — PROGRESS NOTES
General Surgery Daily Progress Note Admit Date: 3/24/2019 Post-Operative Day: * No surgery found * from * No surgery found * Subjective:  
 
Last 24 hrs: Pt is sitting up in the chair. Feels much better. No abd pain. Tolerating clears. 3 BMs yesterday. Objective:  
 
Blood pressure (!) 166/92, pulse 97, temperature 99.2 °F (37.3 °C), resp. rate 16, weight 233 lb 14.5 oz (106.1 kg), SpO2 94 %. Temp (24hrs), Av °F (37.2 °C), Min:98.2 °F (36.8 °C), Max:99.7 °F (37.6 °C) 
 
 
_____________________ Physical Exam:    
Alert and Oriented, x3, in no acute distress. Cardiovascular: RRR, no peripheral edema Lungs:CTAB Abdomen: some distention but soft, +BS, NT Assessment:  
Active Problems: 
  Small bowel obstruction ([address] (3/24/2019) Plan:  
 
Gi lite diet Restart PTA meds OOB as he is doing Home soon Data Review: 
 
Recent Labs  
  19 
0321 19 
1416 WBC 7.2 7.3 HGB 13.0 14.2 HCT 39.5 42.7  303 Recent Labs  
  19 
0321 19 
1416  137  
K 3.4* 3.5  101 CO2 25 28 GLU 88 133* BUN 11 15 CREA 0.74 0.95  
CA 7.9* 8.0* ALB  --  2.9*  
SGOT  --  13* ALT  --  23 Recent Labs  
  19 
1416 LPSE 100  
 
 
 
 
______________________ Medications: 
 
Current Facility-Administered Medications Medication Dose Route Frequency  oxyCODONE IR (ROXICODONE) tablet 5 mg  5 mg Oral Q4H PRN  
 lactated Ringers infusion  75 mL/hr IntraVENous CONTINUOUS  
 ondansetron (ZOFRAN) injection 4 mg  4 mg IntraVENous Q4H PRN  
 HYDROmorphone (PF) (DILAUDID) injection 1 mg  1 mg IntraVENous Q3H PRN  
 acetaminophen (TYLENOL) tablet 650 mg  650 mg Oral Q6H PRN Luis Manuel Butts NP 
3/26/2019 ADDENDUM:  Sarah Williamson MD 
Pt seen and examined. No acute surgical issues. Abdominal pain has resolved. Tolerating diet without nausea or vomiting. Plan DC home this afternoon. Follow-up in clinic as needed.

## 2019-03-26 NOTE — ANCILLARY DISCHARGE INSTRUCTIONS
3/26/2019 RE: Jose De Jesus Perez To Whom it May Concern: This is to certify that Jose De Jesus Perez was admitted to the hospital for an illness on March 24, 2019. He may return to work on Monday, April 1, 2019. Please feel free to contact my office at [phone] if you have any questions or concerns. Thank you for your assistance in this matter. Sincerely, Shekhar Wang MD

## 2019-03-26 NOTE — PROGRESS NOTES
Bedside shift change report given to Darlene (oncoming nurse) by Brian Hauser (offgoing nurse). Report included the following information SBAR, Kardex, MAR and Quality Measures.

## 2019-03-26 NOTE — ROUTINE PROCESS
Bedside and Verbal shift change report given to Karen May RN (oncoming nurse) by Yesenia Nazario RN (offgoing nurse). Report included the following information SBAR, MAR and Recent Results.

## 2019-03-26 NOTE — PROGRESS NOTES
Reason for Admission:   SBO 
                
RRAT Score:          2 Plan for utilizing home health:      Not Likely Likelihood of Readmission:  Low Transition of Care Plan:        Patient has no hx of HH, IPR, or SNF. Pharmacy preference is [facility].  Patient's spouse lives in [place] and is currently on the way to the hospital.  Patient reports being very active, including regularly cycling 50+ miles. Patient has family support, including cousin living next door, and his spouse is in the process of moving to the area. No additional CM needs are noted. Care Management Interventions PCP Verified by CM: Yes(Followed by MISTY Ramos) Last Visit to PCP: 03/22/19 Mode of Transport at Discharge: Other (see comment)(spouse to transport) Transition of Care Consult (CM Consult): Discharge Planning MyChart Signup: No 
Discharge Durable Medical Equipment: No(Has DME of [employer]) Health Maintenance Reviewed: Yes(CM met with patient, with patient alert and oriented x 4) Physical Therapy Consult: No 
Occupational Therapy Consult: No 
Speech Therapy Consult: No 
Current Support Network: Own Home, Lives Alone, Family Lives [place], Other(Cousin lives next door; spouse currently lives in [place]) Confirm Follow Up Transport: Self(Independent in ADLs, to include living alone and driving) Plan discussed with Pt/Family/Caregiver: Yes [employer] Information Provided?: No 
Discharge Location Discharge Placement: Home with family assistance(Lives alone in a first floor apartment with 1 exterior step) CRM: Tyler Monsalve, MPH, [address] [place] [address]; Z: [phone]

## 2019-04-10 NOTE — DISCHARGE SUMMARY
Physician Discharge Summary     Patient ID:  Geronimo Lopes  [id number]  63 y.o.  1960    Allergies: Patient has no known allergies. Admit Date: 3/24/2019    Discharge Date: 3/26/2019    * Admission Diagnoses: Small bowel obstruction ([facility] [address] [Z27.081]    * Discharge Diagnoses:    Hospital Problems as of 3/26/2019 Never Reviewed          Codes Class Noted - Resolved POA    Small bowel obstruction ([facility] [address] ICD-10-CM: S82.046  ICD-9-CM: 560.9  3/24/2019 - Present Unknown               Admission Condition: Fair    * Discharge Condition: good    * Procedures: * No surgery found [facility] Course:   Mr. Emeterio Luis is a 62 yo man with hx HTN and no prior abdominal operation presented to [place] with abdominal pain for 4 days. HE had a CT scan performed which showed a partial bowel obstruction possibly from internal hernia. He was treated with bowel rest and IV fluid hydration. His symptoms quickly improved and he had return of bowel function so his diet was advance. Consults: None    Significant Diagnostic Studies: radiology: CT scan: Abdomen and pelvis    * Disposition: Home    Discharge Medications:   Discharge Medication List as of 3/26/2019  3:26 PM          * Follow-up Care/Patient Instructions: Activity: Activity as tolerated  Diet: Regular Diet  Wound Care: None needed    Follow-up Information     Follow up With Specialties Details Why Contact Info    MISTY Hays Physician Assistant  PCP is with [facility], walk in facility. [place] [id number]  [phone]          Follow-up tests/labs None      ADDENDUM FOR CODING QUERY:     The patient was noted to have Fever, Tachycardia which clinically improved with IV fluid hydration and tylenol.

## 2023-02-04 NOTE — CDMP QUERY
Patient admitted with SBO, noted to have fever, tachycardia POA. If possible, please document in progress notes and d/c summary if you are evaluating and/or treating any of the following: 
 
=> SIRS of non-infectious origin without acute organ dysfunction POA 
=> Fever, Tachycardia- not clinically significant 
=> Other explanation of clinical findings 
=> Clinically Undetermined (no explanation for clinical findings) The medical record reflects the following: 
   Risk Factors: SBO Clinical Indicators: Temp to 102  Pulse to 105 Treatment: bolus, IVFs, lab monitoring Thank you, Xavi Guerra RN 
[facility] 
[phone]

## 2024-01-25 NOTE — PROGRESS NOTES
Bedside shift change report given to Lizzie Albright (oncoming nurse) by Evelin Bach (offgoing nurse). Report included the following information SBAR. Detail Level: Detailed Depth Of Biopsy: dermis Was A Bandage Applied: Yes Size Of Lesion In Cm: 2 X Size Of Lesion In Cm: 0 Biopsy Type: H and E Biopsy Method: Dermablade Anesthesia Type: 1% lidocaine with epinephrine Anesthesia Volume In Cc: 0.5 Hemostasis: Electrocautery Wound Care: Petrolatum Dressing: bandage Destruction After The Procedure: No Type Of Destruction Used: Curettage Curettage Text: The wound bed was treated with curettage after the biopsy was performed. Cryotherapy Text: The wound bed was treated with cryotherapy after the biopsy was performed. Electrodesiccation Text: The wound bed was treated with electrodesiccation after the biopsy was performed. Electrodesiccation And Curettage Text: The wound bed was treated with electrodesiccation and curettage after the biopsy was performed. Silver Nitrate Text: The wound bed was treated with silver nitrate after the biopsy was performed. Lab: [id number] Lab Facility: [id number] Consent: Written consent was obtained and risks were reviewed including but not limited to scarring, infection, bleeding, scabbing, incomplete removal, nerve damage and allergy to anesthesia. Post-Care Instructions: I reviewed with the patient in detail post-care instructions. Patient is to keep the biopsy site dry overnight, and then apply bacitracin twice daily until healed. Patient may apply hydrogen peroxide soaks to remove any crusting. Notification Instructions: Patient will be notified of biopsy results. However, patient instructed to call the office if not contacted within 2 weeks. Billing Type: Third-Party Bill Information: Selecting Yes will display possible errors in your note based on the variables you have selected. This validation is only offered as a suggestion for you. PLEASE NOTE THAT THE VALIDATION TEXT WILL BE REMOVED WHEN YOU FINALIZE YOUR NOTE. IF YOU WANT TO FAX A PRELIMINARY NOTE YOU WILL NEED TO TOGGLE THIS TO 'NO' IF YOU DO NOT WANT IT IN YOUR FAXED NOTE.